# Patient Record
Sex: FEMALE | Race: BLACK OR AFRICAN AMERICAN | NOT HISPANIC OR LATINO | URBAN - METROPOLITAN AREA
[De-identification: names, ages, dates, MRNs, and addresses within clinical notes are randomized per-mention and may not be internally consistent; named-entity substitution may affect disease eponyms.]

---

## 2020-10-21 ENCOUNTER — EMERGENCY (EMERGENCY)
Facility: HOSPITAL | Age: 55
LOS: 1 days | Discharge: HOME | End: 2020-10-21
Attending: EMERGENCY MEDICINE | Admitting: EMERGENCY MEDICINE
Payer: MEDICAID

## 2020-10-21 VITALS
RESPIRATION RATE: 18 BRPM | HEART RATE: 75 BPM | OXYGEN SATURATION: 98 % | TEMPERATURE: 98 F | DIASTOLIC BLOOD PRESSURE: 75 MMHG | SYSTOLIC BLOOD PRESSURE: 98 MMHG

## 2020-10-21 VITALS
SYSTOLIC BLOOD PRESSURE: 139 MMHG | DIASTOLIC BLOOD PRESSURE: 84 MMHG | RESPIRATION RATE: 18 BRPM | HEART RATE: 67 BPM | TEMPERATURE: 98 F | OXYGEN SATURATION: 100 %

## 2020-10-21 LAB
ALBUMIN SERPL ELPH-MCNC: 3.9 G/DL — SIGNIFICANT CHANGE UP (ref 3.3–5)
ALP SERPL-CCNC: 99 U/L — SIGNIFICANT CHANGE UP (ref 40–120)
ALT FLD-CCNC: 21 U/L — SIGNIFICANT CHANGE UP (ref 4–33)
ANION GAP SERPL CALC-SCNC: 9 MMO/L — SIGNIFICANT CHANGE UP (ref 7–14)
APPEARANCE UR: CLEAR — SIGNIFICANT CHANGE UP
AST SERPL-CCNC: 20 U/L — SIGNIFICANT CHANGE UP (ref 4–32)
BACTERIA # UR AUTO: NEGATIVE — SIGNIFICANT CHANGE UP
BILIRUB SERPL-MCNC: < 0.2 MG/DL — LOW (ref 0.2–1.2)
BILIRUB UR-MCNC: NEGATIVE — SIGNIFICANT CHANGE UP
BLOOD UR QL VISUAL: NEGATIVE — SIGNIFICANT CHANGE UP
BUN SERPL-MCNC: 20 MG/DL — SIGNIFICANT CHANGE UP (ref 7–23)
CALCIUM SERPL-MCNC: 10 MG/DL — SIGNIFICANT CHANGE UP (ref 8.4–10.5)
CHLORIDE SERPL-SCNC: 106 MMOL/L — SIGNIFICANT CHANGE UP (ref 98–107)
CO2 SERPL-SCNC: 24 MMOL/L — SIGNIFICANT CHANGE UP (ref 22–31)
COLOR SPEC: SIGNIFICANT CHANGE UP
CREAT SERPL-MCNC: 0.43 MG/DL — LOW (ref 0.5–1.3)
GLUCOSE SERPL-MCNC: 98 MG/DL — SIGNIFICANT CHANGE UP (ref 70–99)
GLUCOSE UR-MCNC: NEGATIVE — SIGNIFICANT CHANGE UP
HCT VFR BLD CALC: 42 % — SIGNIFICANT CHANGE UP (ref 34.5–45)
HGB BLD-MCNC: 13.3 G/DL — SIGNIFICANT CHANGE UP (ref 11.5–15.5)
HYALINE CASTS # UR AUTO: NEGATIVE — SIGNIFICANT CHANGE UP
KETONES UR-MCNC: NEGATIVE — SIGNIFICANT CHANGE UP
LEUKOCYTE ESTERASE UR-ACNC: SIGNIFICANT CHANGE UP
MAGNESIUM SERPL-MCNC: 1.8 MG/DL — SIGNIFICANT CHANGE UP (ref 1.6–2.6)
MCHC RBC-ENTMCNC: 30.2 PG — SIGNIFICANT CHANGE UP (ref 27–34)
MCHC RBC-ENTMCNC: 31.7 % — LOW (ref 32–36)
MCV RBC AUTO: 95.2 FL — SIGNIFICANT CHANGE UP (ref 80–100)
NITRITE UR-MCNC: NEGATIVE — SIGNIFICANT CHANGE UP
NRBC # FLD: 0 K/UL — SIGNIFICANT CHANGE UP (ref 0–0)
PH UR: 7 — SIGNIFICANT CHANGE UP (ref 5–8)
PHOSPHATE SERPL-MCNC: 3.7 MG/DL — SIGNIFICANT CHANGE UP (ref 2.5–4.5)
PLATELET # BLD AUTO: 412 K/UL — HIGH (ref 150–400)
PMV BLD: 10.8 FL — SIGNIFICANT CHANGE UP (ref 7–13)
POTASSIUM SERPL-MCNC: 4.6 MMOL/L — SIGNIFICANT CHANGE UP (ref 3.5–5.3)
POTASSIUM SERPL-SCNC: 4.6 MMOL/L — SIGNIFICANT CHANGE UP (ref 3.5–5.3)
PROT SERPL-MCNC: 7 G/DL — SIGNIFICANT CHANGE UP (ref 6–8.3)
PROT UR-MCNC: NEGATIVE — SIGNIFICANT CHANGE UP
RBC # BLD: 4.41 M/UL — SIGNIFICANT CHANGE UP (ref 3.8–5.2)
RBC # FLD: 13.9 % — SIGNIFICANT CHANGE UP (ref 10.3–14.5)
RBC CASTS # UR COMP ASSIST: SIGNIFICANT CHANGE UP (ref 0–?)
SODIUM SERPL-SCNC: 139 MMOL/L — SIGNIFICANT CHANGE UP (ref 135–145)
SP GR SPEC: 1.02 — SIGNIFICANT CHANGE UP (ref 1–1.04)
SQUAMOUS # UR AUTO: SIGNIFICANT CHANGE UP
UROBILINOGEN FLD QL: NORMAL — SIGNIFICANT CHANGE UP
WBC # BLD: 13.56 K/UL — HIGH (ref 3.8–10.5)
WBC # FLD AUTO: 13.56 K/UL — HIGH (ref 3.8–10.5)
WBC UR QL: HIGH (ref 0–?)

## 2020-10-21 PROCEDURE — 99283 EMERGENCY DEPT VISIT LOW MDM: CPT

## 2020-10-21 NOTE — ED PROVIDER NOTE - NSFOLLOWUPINSTRUCTIONS_ED_ALL_ED_FT
You came to the hospital with weakness. Your labs did not reveal any medical conditions causing your symptoms. Please follow-up with your primary care doctor.

## 2020-10-21 NOTE — ED PROVIDER NOTE - OBJECTIVE STATEMENT
55F PMH unknown psychotic disorder presenting from [place] with weakness x 3 days. Denies CP, cough, SOB, abdominal pain, n/v/d, dysuria. Is tolerating PO. No recent changes in diet or medications.

## 2020-10-21 NOTE — ED ADULT NURSE NOTE - NSIMPLEMENTINTERV_GEN_ALL_ED
Implemented All Universal Safety Interventions:  [place] to call system. Call bell, personal items and telephone within reach. Instruct patient to call for assistance. Room bathroom lighting operational. Non-slip footwear when patient is off stretcher. Physically safe environment: no spills, clutter or unnecessary equipment. Stretcher in lowest position, wheels locked, appropriate side rails in place.

## 2020-10-21 NOTE — ED PROVIDER NOTE - PATIENT PORTAL LINK FT
You can access the FollowMyHealth Patient Portal offered by [facility] by registering at the following website: [email or web address]. By joining [employer]’s FollowMyHealth portal, you will also be able to view your health information using other applications (apps) compatible with our system.

## 2020-10-21 NOTE — ED ADULT NURSE NOTE - OBJECTIVE STATEMENT
Pt received in rm #27, 55Y F Aox3, ambulatory with steady gait c/o generalized weakness. Pt denies any pmhx but reports possibly psychiatric hx but unsure of what? Pt endorsing generalized weakness, denies fevers, chills, sob, n/v/d. Pt requesting a shower and food at this time, offers no other complaints. Pt calm and cooperative, appears in NAD, IV placed 20G Rt hand, VS as charted, will continue to monitor.

## 2020-10-21 NOTE — ED ADULT TRIAGE NOTE - INTERNATIONAL TRAVEL
Jillian Brody called and stated that she is interested in becoming a living donor for her uncle (by marriage) Benji Vasquez.  Medical and social history obtained.  No contraindications noted.  All questions answered.  Education book emailed to patient. She will review and call if she would like to be tested.   
No

## 2020-10-21 NOTE — ED PROVIDER NOTE - ATTENDING CONTRIBUTION TO CARE
I have seen and examined the patient on the patient´s visit date. I have reviewed the note written by Prasad Nesbitt DO on that visit day. I have supervised and participated as necessary in the performance of procedures indicated for patient management and was available at all phases of the patient´s visit when needed. We discussed the history, physical exam findings, mnagement plan, and  medical decision making. I have made my additons, exceptions, and revisions within the chart and I agree with H and P as documented in its entirety. The data and my interpretation of any data collected from labs, interventions and imaging appear below as well as my independent medical decision making and considerations    The patient is a 55y Female who has a past medical and surgery history of PTED with as per pt speaks with slurred speech at baseline has psychiatric hx  weakness, weakness started 2 days ago sent from [facility]    Vital Signs Last 24 Hrs  T(F): 98 HR: 65 BP: 112/65 RR: 18 SpO2: 100% (21 Oct 2020 21:03) (98% - 100%)  PE: as described; my additions and exceptions are noted in the chart I have seen and examined the patient on the patient´s visit date. I have reviewed the note written by Prasad Nesbitt DO on that visit day. I have supervised and participated as necessary in the performance of procedures indicated for patient management and was available at all phases of the patient´s visit when needed. We discussed the history, physical exam findings, mnagement plan, and  medical decision making. I have made my additons, exceptions, and revisions within the chart and I agree with H and P as documented in its entirety. The data and my interpretation of any data collected from labs, interventions and imaging appear below as well as my independent medical decision making and considerations    The patient is a 55y Female who has a past medical and surgery history of PTED with as per pt speaks with slurred speech at baseline has psychiatric hx  weakness, weakness started 2 days ago sent from [facility]    Vital Signs Last 24 Hrs  T(F): 98 HR: 65 BP: 112/65 RR: 18 SpO2: 100% (21 Oct 2020 21:03) (98% - 100%)  PE: as described; my additions and exceptions are noted in the chart  Plan  will perform labs  reassess   Dispo as per results and response; probable d/c

## 2020-10-22 NOTE — PROVIDER CONTACT NOTE (OTHER) - ACTION/TREATMENT ORDERED:
[employer] ordered for pt travel, on account 50 as pt reports not having Medicaid or funds to pay for taxi.

## 2020-10-22 NOTE — PROVIDER CONTACT NOTE (OTHER) - ASSESSMENT
As per Robert, pt is new to the respite program and can return to the residence.  States she is independent in travel via taxi.  Address to residence confirmed as [address] [place], [place].  Discussed taxi transportation recommendation with clinical provider, who is in agreement with plan.  Pt aware of and in agreement with plan to travel via taxi. As per Robert, pt is new to the respite program and can return to the residence.  States she is independent in travel via taxi.  Address to residence confirmed as [address], [address], [place], [place].  Discussed taxi transportation recommendation with clinical provider, who is in agreement with plan.  Pt and RN Kimo aware of and in agreement with plan to travel via taxi.

## 2020-10-22 NOTE — ED ADULT NURSE REASSESSMENT NOTE - NS ED NURSE REASSESS COMMENT FT1
Spoke with Robert from transitonal services via [phone] about patients arrival. Phone number given to ED SW for discharge plan. Will continue to monitor.

## 2020-10-22 NOTE — PROVIDER CONTACT NOTE (OTHER) - SITUATION
SW notified by Dr. Perez that pt needs transportation back to her residence; [facility] [address]. SW notified by Dr. Hernandez that pt is medically cleared for d/c and needs transportation back to her residence; pt is from [address] [place] [address] [facility] of [facility].

## 2021-01-16 ENCOUNTER — EMERGENCY (EMERGENCY)
Facility: HOSPITAL | Age: 56
LOS: 1 days | Discharge: HOME | End: 2021-01-16
Attending: EMERGENCY MEDICINE | Admitting: EMERGENCY MEDICINE
Payer: MEDICAID

## 2021-01-16 VITALS
HEART RATE: 110 BPM | SYSTOLIC BLOOD PRESSURE: 164 MMHG | RESPIRATION RATE: 20 BRPM | TEMPERATURE: 97 F | DIASTOLIC BLOOD PRESSURE: 104 MMHG | OXYGEN SATURATION: 99 %

## 2021-01-16 PROCEDURE — 99282 EMERGENCY DEPT VISIT SF MDM: CPT

## 2021-01-16 NOTE — ED ADULT NURSE NOTE - CHIEF COMPLAINT QUOTE
pt recently discharged from [facility] . pt called ambulance because she does not have enough money to take bus to go to [facility] [place]. pt requesting a  for transport. pt upset in triage. MD santa evaluated patient in [facility]

## 2021-01-16 NOTE — PROVIDER CONTACT NOTE (OTHER) - BACKGROUND
SW contacted by NEGAR Rondon who informs Pt (prefers to be called Mrs. Hill) was D/C from Respite to Shelter, Pt states was given a metrocard with insufficient funds is requesting metrocard.

## 2021-01-16 NOTE — ED ADULT NURSE REASSESSMENT NOTE - NS ED NURSE REASSESS COMMENT FT1
IDANIA Rodrigues set up medicaid cab for pt to main assessment shelter [facility]. Pt agitated about having to go to shelter, became aggressive and charged closely to IDANIA's face. Panic button pressed,. Assistance  by staff, manager and Security at side. Pt then agreed after much explanation by manager to go to shelter. Security escorted pt to taxi outside.

## 2021-01-16 NOTE — ED ADULT NURSE NOTE - OBJECTIVE STATEMENT
report received from night nurse on pt. Pt waiting for SW to set up transportation to shelter. Pt eating breakfast, calm cooperative at present. Pt states she wants to be referred to as Mandi Hill. Pt states, "my name is Mandipetar Hill and I am Yomi."

## 2021-01-16 NOTE — ED PROVIDER NOTE - CLINICAL SUMMARY MEDICAL DECISION MAKING FREE TEXT BOX
PT p/w no acute complaints, states called 911 due to no money on metrocard and requesting transportation for [facility]. no acute complaints. MSE performed.

## 2021-01-16 NOTE — PROVIDER CONTACT NOTE (OTHER) - ASSESSMENT
SW reviewed chart contacted [facility] [phone] spoke with staff Hafsa who informs Pt was D/C yesterday was given information for shelter but remained outside their area.  IDANIA met with Pt who is homeless and requests metrocard.  SW educated on main assessment shelter assistance from ER.  SW contacted [facility] [phone] spoke with staff Avinash who provided confirmation #[id number] with Terry Arora to Main Assessment shelter [address] [place] [address].  IDANIA informed Pt SW able to provide taxi to shelter, Pt became agitated stating she did not like that shelter, Security was called Pt disruptive in ED, Pt then agreed to assistance after Security and RN Manager met with Pt. Pt escorted by security to wait for taxi outside.  No further SW intervention required at this time.

## 2021-01-16 NOTE — ED PROVIDER NOTE - OBJECTIVE STATEMENT
52 y./o f with pmhx of schizo, p/w request for metrocard. pt was a [facility] and was d/tc to [facility] in [place]. pt with no acute complaints except screaming abt having no money on metrocard. pt hd stable except mild tachycardia but pt is actively agitated in ED due to be left out in the cold, otherwise normotensive, with no tachypnea, hypoxia. well appearing.

## 2021-01-16 NOTE — ED PROVIDER NOTE - PATIENT PORTAL LINK FT
You can access the FollowMyHealth Patient Portal offered by [facility] by registering at the following website: [email or web address]. By joining [employer]s FollowMyHealth portal, you will also be able to view your health information using other applications (apps) compatible with our system.

## 2021-01-16 NOTE — ED PROVIDER NOTE - MUSCULOSKELETAL, MLM
Spine appears normal, range of motion is not limited, no muscle or joint tenderness How Severe Is This Condition?: mild

## 2021-01-16 NOTE — ED ADULT TRIAGE NOTE - CHIEF COMPLAINT QUOTE
pt recently discharged from [facility] . pt called ambulance because she does not have enough money to take bus to go to [facility] [place]. pt requesting a  for transport. MD santa evaluated patient in [facility] pt recently discharged from [facility] . pt called ambulance because she does not have enough money to take bus to go to [facility] [place]. pt requesting a  for transport. pt upset in triage. MD santa evaluated patient in [facility]